# Patient Record
Sex: FEMALE | Race: OTHER | NOT HISPANIC OR LATINO
[De-identification: names, ages, dates, MRNs, and addresses within clinical notes are randomized per-mention and may not be internally consistent; named-entity substitution may affect disease eponyms.]

---

## 2022-09-30 ENCOUNTER — APPOINTMENT (OUTPATIENT)
Dept: OTOLARYNGOLOGY | Facility: CLINIC | Age: 82
End: 2022-09-30

## 2022-09-30 DIAGNOSIS — H61.23 IMPACTED CERUMEN, BILATERAL: ICD-10-CM

## 2022-09-30 DIAGNOSIS — H90.3 SENSORINEURAL HEARING LOSS, BILATERAL: ICD-10-CM

## 2022-09-30 PROBLEM — Z00.00 ENCOUNTER FOR PREVENTIVE HEALTH EXAMINATION: Status: ACTIVE | Noted: 2022-09-30

## 2022-09-30 PROCEDURE — 92550 TYMPANOMETRY & REFLEX THRESH: CPT

## 2022-09-30 PROCEDURE — 99204 OFFICE O/P NEW MOD 45 MIN: CPT | Mod: 25

## 2022-09-30 PROCEDURE — G0268 REMOVAL OF IMPACTED WAX MD: CPT

## 2022-09-30 PROCEDURE — 92557 COMPREHENSIVE HEARING TEST: CPT

## 2022-10-01 PROBLEM — H61.23 BILATERAL IMPACTED CERUMEN: Status: ACTIVE | Noted: 2022-10-01

## 2022-10-01 NOTE — ASSESSMENT
[FreeTextEntry1] : 1. b snhl\par - showed b severe snhl, left slightly worse than the r; WR- R- 64%, L-36%- results reviewed with pt and daughter\par -recommended hae- pt has had them in the past and is interested in getting new ones\par -discussed CI  if ha not useful as has poor l sds but is not a native speaker\par 2. mass in r eac - etiology unclear\par -CT temporal bones\par RTC with CT to review findings \par 3. cerumen removed; advised not to use cotton swabs

## 2022-10-01 NOTE — PROCEDURE
[Cerumen Impaction] : Cerumen Impaction [Same] : same as the Pre Op Dx. [] : Removal of Cerumen [FreeTextEntry6] : b copious cerumen impaction removed atraumatically with suction - r inferiorly-based lesion filling half of eac seen

## 2022-10-01 NOTE — DATA REVIEWED
[de-identified] :  showed b severe snhl, left slightly worse the r; WR- R- 64%, L-36%- results reviewed with pt and daughter

## 2022-10-01 NOTE — HISTORY OF PRESENT ILLNESS
[de-identified] : 83 yo f here with her daughter presenting with  hl b progressive for years and needs clear enunciation when people speak to her. She is visiting from [place]. She uses tv volume at 100  for yrs. Denies otalgia or drainage. She has a known growth in her right ear canal and has not had imaging for this. Denies loud noise exposure.  No FH or SH pertinent to cc. Nonsmoker. At request of daughter, staff was present to translate [place] during entire encounter.

## 2022-10-01 NOTE — PHYSICAL EXAM
[de-identified] : b copious cerumen removed atraumatically with suction - r inf based lesion filling half of eac seen  [Normal] : assessment of respiratory effort is normal [de-identified] : gait steady

## 2022-10-10 ENCOUNTER — APPOINTMENT (OUTPATIENT)
Dept: OTOLARYNGOLOGY | Facility: CLINIC | Age: 82
End: 2022-10-10

## 2022-10-10 VITALS
TEMPERATURE: 97.2 F | DIASTOLIC BLOOD PRESSURE: 69 MMHG | RESPIRATION RATE: 14 BRPM | OXYGEN SATURATION: 97 % | SYSTOLIC BLOOD PRESSURE: 107 MMHG | HEIGHT: 55 IN | BODY MASS INDEX: 31.94 KG/M2 | WEIGHT: 138 LBS | HEART RATE: 76 BPM

## 2022-10-10 DIAGNOSIS — H93.8X1 OTHER SPECIFIED DISORDERS OF RIGHT EAR: ICD-10-CM

## 2022-10-10 PROCEDURE — 99213 OFFICE O/P EST LOW 20 MIN: CPT

## 2022-10-10 NOTE — PHYSICAL EXAM
[de-identified] : r mass, fleshy, in inferior eac fills 50% of diameter; l nl [Normal] : assessment of respiratory effort is normal [de-identified] : gait steady

## 2022-10-10 NOTE — HISTORY OF PRESENT ILLNESS
[de-identified] : 10 d followup appt for this 83 yo f with r eac mass. She has some aural fullness AD. Last visit this appeared to be soft tissue so ct was ordered to assess. She is here to review report. Denies pain or drainage. Going home to [facility] in 18 days. She is here with her daughter. Seen with MA who translates.

## 2022-10-10 NOTE — ASSESSMENT
[FreeTextEntry1] : mass in r eac\par \par With FLORA currie, I showed pt her ct scan and I recommended biopsy- and explained that this could be done in office and that we were prepared to do this today. Patient absolutely refused; daughter said her mother makes her own decisions. They both said she has a physician in [facility] who can follow this and make recommendations if it grows. I continued to explain, with FLORA currie, that this could be cancer, and the biopsy is a fast easy procedure which could help us determine management and that she may need surgery or other treatment, which we could do if indicated. I explained that if this is cancer, waiting could cause this to grow, making it more difficult to remove, increase complications or ultimately put her life in jeopardy. \par \par Patient absolutely refused biopsy today or to allow bx to be scheduled here. She clearly understood what we told her. Patient's daughter said she would speak with the rest of the family and get back to me, and, regardless, they will followup with her doctor in [facility]. PA gave them copy of the ct to take with them.

## 2023-04-25 ENCOUNTER — APPOINTMENT (OUTPATIENT)
Dept: SURGERY | Facility: CLINIC | Age: 83
End: 2023-04-25
Payer: MEDICARE

## 2023-04-25 PROCEDURE — 99203 OFFICE O/P NEW LOW 30 MIN: CPT

## 2023-04-25 PROCEDURE — 99243 OFF/OP CNSLTJ NEW/EST LOW 30: CPT

## 2023-04-27 ENCOUNTER — APPOINTMENT (OUTPATIENT)
Dept: SURGERY | Facility: CLINIC | Age: 83
End: 2023-04-27

## 2023-05-23 ENCOUNTER — TRANSCRIPTION ENCOUNTER (OUTPATIENT)
Age: 83
End: 2023-05-23

## 2023-05-23 ENCOUNTER — OUTPATIENT (OUTPATIENT)
Dept: OUTPATIENT SERVICES | Facility: HOSPITAL | Age: 83
LOS: 1 days | End: 2023-05-23

## 2023-05-23 VITALS
RESPIRATION RATE: 18 BRPM | TEMPERATURE: 98 F | HEIGHT: 55 IN | HEART RATE: 53 BPM | WEIGHT: 134.04 LBS | OXYGEN SATURATION: 96 % | DIASTOLIC BLOOD PRESSURE: 64 MMHG | SYSTOLIC BLOOD PRESSURE: 103 MMHG

## 2023-05-23 DIAGNOSIS — I10 ESSENTIAL (PRIMARY) HYPERTENSION: ICD-10-CM

## 2023-05-23 DIAGNOSIS — K81.9 CHOLECYSTITIS, UNSPECIFIED: ICD-10-CM

## 2023-05-23 DIAGNOSIS — Z29.9 ENCOUNTER FOR PROPHYLACTIC MEASURES, UNSPECIFIED: ICD-10-CM

## 2023-05-23 DIAGNOSIS — E78.5 HYPERLIPIDEMIA, UNSPECIFIED: ICD-10-CM

## 2023-05-23 DIAGNOSIS — Z01.812 ENCOUNTER FOR PREPROCEDURAL LABORATORY EXAMINATION: ICD-10-CM

## 2023-05-23 LAB — BLD GP AB SCN SERPL QL: SIGNIFICANT CHANGE UP

## 2023-05-23 NOTE — H&P PST ADULT - ASSESSMENT
81 yo female is scheduled for : Laparoscopic Cholecystectomy with Intra-Operative Cholangiogram  Possible Open, on 5/24/23

## 2023-05-23 NOTE — H&P PST ADULT - NSANTHOSAYNRD_GEN_A_CORE
No. JEANETTE screening performed.  STOP BANG Legend: 0-2 = LOW Risk; 3-4 = INTERMEDIATE Risk; 5-8 = HIGH Risk

## 2023-05-23 NOTE — H&P PST ADULT - HISTORY OF PRESENT ILLNESS
83 yo female reports the above.  Patient states had abdominal pain, felt something like swelling in the upper part of the abdomen, mostly after eating, in [facility] three to four months ago.  A US of the abdomen, revealed stones in the gallbladder. [place] back [place] to have [place].  She is scheduled for :  Laparoscopic Cholecystectomy with Intra-Operative Cholangiogram  Possible Open, on 5/24/23

## 2023-05-23 NOTE — H&P PST ADULT - PROBLEM SELECTOR PLAN 1
Laparoscopic Cholecystectomy with Intra-Operative Cholangiogram  Possible Open      STOP BANG : 2  Low risk for JEANETTE complications

## 2023-05-24 ENCOUNTER — TRANSCRIPTION ENCOUNTER (OUTPATIENT)
Age: 83
End: 2023-05-24

## 2023-05-24 ENCOUNTER — OUTPATIENT (OUTPATIENT)
Dept: OUTPATIENT SERVICES | Facility: HOSPITAL | Age: 83
LOS: 1 days | End: 2023-05-24
Payer: MEDICARE

## 2023-05-24 ENCOUNTER — RESULT REVIEW (OUTPATIENT)
Age: 83
End: 2023-05-24

## 2023-05-24 ENCOUNTER — APPOINTMENT (OUTPATIENT)
Dept: SURGERY | Facility: HOSPITAL | Age: 83
End: 2023-05-24

## 2023-05-24 PROCEDURE — 88305 TISSUE EXAM BY PATHOLOGIST: CPT | Mod: 26

## 2023-05-24 PROCEDURE — 88304 TISSUE EXAM BY PATHOLOGIST: CPT | Mod: 26

## 2023-05-24 PROCEDURE — 88312 SPECIAL STAINS GROUP 1: CPT | Mod: 26

## 2023-05-24 RX ORDER — CHOLECALCIFEROL (VITAMIN D3) 125 MCG
0 CAPSULE ORAL
Refills: 0 | DISCHARGE

## 2023-05-24 RX ORDER — LOSARTAN POTASSIUM 100 MG/1
0 TABLET, FILM COATED ORAL
Refills: 0 | DISCHARGE

## 2023-05-24 RX ORDER — ATORVASTATIN CALCIUM 80 MG/1
0 TABLET, FILM COATED ORAL
Refills: 0 | DISCHARGE

## 2023-05-25 ENCOUNTER — TRANSCRIPTION ENCOUNTER (OUTPATIENT)
Age: 83
End: 2023-05-25

## 2023-05-25 PROCEDURE — 80053 COMPREHEN METABOLIC PANEL: CPT

## 2023-05-25 PROCEDURE — 80048 BASIC METABOLIC PNL TOTAL CA: CPT

## 2023-05-25 PROCEDURE — G0463: CPT

## 2023-05-25 PROCEDURE — 86901 BLOOD TYPING SEROLOGIC RH(D): CPT

## 2023-05-25 PROCEDURE — 76000 FLUOROSCOPY <1 HR PHYS/QHP: CPT

## 2023-05-25 PROCEDURE — 85027 COMPLETE CBC AUTOMATED: CPT

## 2023-05-25 PROCEDURE — 82962 GLUCOSE BLOOD TEST: CPT

## 2023-05-25 PROCEDURE — 43264 ERCP REMOVE DUCT CALCULI: CPT

## 2023-05-25 PROCEDURE — 85730 THROMBOPLASTIN TIME PARTIAL: CPT

## 2023-05-25 PROCEDURE — 85025 COMPLETE CBC W/AUTO DIFF WBC: CPT

## 2023-05-25 PROCEDURE — 85610 PROTHROMBIN TIME: CPT

## 2023-05-25 PROCEDURE — 88312 SPECIAL STAINS GROUP 1: CPT

## 2023-05-25 PROCEDURE — 88305 TISSUE EXAM BY PATHOLOGIST: CPT

## 2023-05-25 PROCEDURE — 88304 TISSUE EXAM BY PATHOLOGIST: CPT

## 2023-05-25 PROCEDURE — 43239 EGD BIOPSY SINGLE/MULTIPLE: CPT

## 2023-05-25 PROCEDURE — 74300 X-RAY BILE DUCTS/PANCREAS: CPT

## 2023-05-25 PROCEDURE — 43262 ENDO CHOLANGIOPANCREATOGRAPH: CPT

## 2023-05-25 PROCEDURE — 86900 BLOOD TYPING SEROLOGIC ABO: CPT

## 2023-05-25 PROCEDURE — C1769: CPT

## 2023-05-25 PROCEDURE — 36415 COLL VENOUS BLD VENIPUNCTURE: CPT

## 2023-05-25 PROCEDURE — C1889: CPT

## 2023-05-25 PROCEDURE — 86850 RBC ANTIBODY SCREEN: CPT

## 2023-05-25 PROCEDURE — 47563 LAPARO CHOLECYSTECTOMY/GRAPH: CPT

## 2023-05-30 PROBLEM — E78.5 HYPERLIPIDEMIA, UNSPECIFIED: Chronic | Status: ACTIVE | Noted: 2023-05-23

## 2023-05-30 PROBLEM — I10 ESSENTIAL (PRIMARY) HYPERTENSION: Chronic | Status: ACTIVE | Noted: 2023-05-23

## 2023-05-30 PROBLEM — E55.9 VITAMIN D DEFICIENCY, UNSPECIFIED: Chronic | Status: ACTIVE | Noted: 2023-05-23

## 2023-06-06 PROBLEM — K80.20 CHOLELITHIASES: Status: ACTIVE | Noted: 2023-05-11

## 2023-06-08 ENCOUNTER — APPOINTMENT (OUTPATIENT)
Dept: SURGERY | Facility: CLINIC | Age: 83
End: 2023-06-08
Payer: MEDICARE

## 2023-06-08 DIAGNOSIS — K80.20 CALCULUS OF GALLBLADDER W/OUT CHOLECYSTITIS W/OUT OBSTRUCTION: ICD-10-CM

## 2023-06-08 PROCEDURE — 99024 POSTOP FOLLOW-UP VISIT: CPT

## 2023-06-08 NOTE — DATA REVIEWED
[FreeTextEntry1] : [facility] Accession Number : [id number]\par Patient:   NELSON MEYERS\par \par \par Accession:                             [id number]\par \par Collected Date/Time:                   5/24/2023 09:00 EDT\par Received Date/Time:                    5/24/2023 15:30 EDT\par \par Surgical Pathology Report - Auth (Verified)\par \par Specimen(s) Submitted\par 1  Gallbladder\par \par Final Diagnosis\par Gallbladder; laparoscopic cholecystectomy:\par -   Chronic cholecystitis with lymphoid follicles\par -   Cholelithiasis\par \par Verified by: Enma Kim M.D.\par (Electronic Signature)\par Reported on: 05/31/23 09:57 EDT, [address]\par Phone: [phone]   Fax: [phone]\par _________________________________________________________________\par \par Clinical Information\par Cholecystitis

## 2023-06-08 NOTE — ASSESSMENT
[FreeTextEntry1] : Ms. MEYERS is doing well, with excellent post-operative recovery. All surgical incisions are healing well and as expected. There is no evidence of infection or complication, and she is progressing as expected. Post-operative wound care, activity, restrictions and precautions reinforced. Patient instructed to refrain from any heavy lifting greater than 10-15 pounds for at least 4 weeks post-operatively. pathology results were discussed in details.  Patient's questions and concerns addressed to patient's satisfaction.

## 2023-06-08 NOTE — HISTORY OF PRESENT ILLNESS
[de-identified] : Ms. MEYERS  is s/p laparoscopic  cholecystectomy  on 05/24/2023. Today Ms. MEYERS offers no complaints. patient reports no fever, chills,  or  pain. Her  surgical wounds are  healing well. No signs of inflammation, infection or exudate. Patient reports good bowel movements and appetite.

## 2023-06-08 NOTE — PLAN
[FreeTextEntry1] : Ms. MEYERS will follow up  if needed. Warning signs, follow up, and restrictions were discussed with the patient.

## 2023-06-08 NOTE — PHYSICAL EXAM
[Calm] : calm [de-identified] : She  is alert, well-groomed, and in NAD\par   [de-identified] : anicteric.  Nasal mucosa pink, septum midline. Oral mucosa pink.  Tongue midline, Pharynx without exudates.\par   [de-identified] : Surgical wounds are  healing well.   no signs of  inflammation or infection.

## 2023-06-20 DIAGNOSIS — K81.9 CHOLECYSTITIS, UNSPECIFIED: ICD-10-CM

## 2024-04-02 NOTE — H&P PST ADULT - GASTROINTESTINAL
Need pt to come in and leave urine sample. First urine sample wasn't sufficient.    details… soft/nontender/nondistended/normal active bowel sounds